# Patient Record
Sex: MALE | Race: WHITE | NOT HISPANIC OR LATINO | URBAN - METROPOLITAN AREA
[De-identification: names, ages, dates, MRNs, and addresses within clinical notes are randomized per-mention and may not be internally consistent; named-entity substitution may affect disease eponyms.]

---

## 2018-09-21 ENCOUNTER — EMERGENCY (EMERGENCY)
Facility: HOSPITAL | Age: 28
LOS: 1 days | Discharge: HOME | End: 2018-09-21
Attending: EMERGENCY MEDICINE | Admitting: EMERGENCY MEDICINE
Payer: COMMERCIAL

## 2018-09-21 VITALS
SYSTOLIC BLOOD PRESSURE: 154 MMHG | OXYGEN SATURATION: 99 % | DIASTOLIC BLOOD PRESSURE: 74 MMHG | HEART RATE: 82 BPM | RESPIRATION RATE: 18 BRPM | TEMPERATURE: 98 F

## 2018-09-21 PROCEDURE — 99282 EMERGENCY DEPT VISIT SF MDM: CPT

## 2018-09-21 NOTE — ED PROVIDER NOTE - MEDICAL DECISION MAKING DETAILS
26 y/o M with cornea abrasion at about 1 pm today No foreign bodies seen on exam. Slight uptake on fluorescein exam. Plan: Lubricating eye drops, NSAID's and Optho follow up. 28 y/o M with cornea abrasion at about 1 pm today No foreign bodies seen on exam, normal acuity. +uptake on fluorescein exam. High- speed injury, to FU ophtho if change in vision. Plan: Lubricating eye drops, NSAID's and Optho follow up.

## 2018-09-21 NOTE — ED PROVIDER NOTE - OBJECTIVE STATEMENT
28 y/o M with a PMHx of [place]az[place] of Left eye and appendicitis presents to the ED c/o a foreign body in his Left eye. Patient was cutting wood with a table saw today and states something got into his eye. Patient feels like a wood chip or something is stuck in his eye. Patient endorses washing out his eye and felt better. But got worse when he closes his eye or blinks. Phyllis double vision, blurred vision or any other related symptoms. NKDA. Tetanus UTD. Patient recalls vaccination 4 years prior.